# Patient Record
Sex: FEMALE | Race: ASIAN
[De-identification: names, ages, dates, MRNs, and addresses within clinical notes are randomized per-mention and may not be internally consistent; named-entity substitution may affect disease eponyms.]

---

## 2023-03-02 ENCOUNTER — OFFICE VISIT (OUTPATIENT)
Dept: FAMILY MEDICINE CLINIC | Facility: CLINIC | Age: 23
End: 2023-03-02
Payer: COMMERCIAL

## 2023-03-02 VITALS
BODY MASS INDEX: 22.69 KG/M2 | WEIGHT: 136.2 LBS | HEART RATE: 69 BPM | HEIGHT: 65 IN | SYSTOLIC BLOOD PRESSURE: 124 MMHG | OXYGEN SATURATION: 98 % | DIASTOLIC BLOOD PRESSURE: 68 MMHG

## 2023-03-02 DIAGNOSIS — N91.5 OLIGOMENORRHEA, UNSPECIFIED TYPE: ICD-10-CM

## 2023-03-02 DIAGNOSIS — F41.0 PANIC DISORDER: ICD-10-CM

## 2023-03-02 DIAGNOSIS — F51.04 PSYCHOPHYSIOLOGICAL INSOMNIA: ICD-10-CM

## 2023-03-02 DIAGNOSIS — Z00.01 ENCOUNTER FOR ROUTINE ADULT HEALTH EXAMINATION WITH ABNORMAL FINDINGS: Primary | ICD-10-CM

## 2023-03-02 DIAGNOSIS — F41.1 GENERALIZED ANXIETY DISORDER: ICD-10-CM

## 2023-03-02 LAB
BASOPHILS # BLD: 0 K/UL (ref 0–0.2)
BASOPHILS NFR BLD: 1 % (ref 0–2)
DIFFERENTIAL METHOD BLD: NORMAL
EOSINOPHIL # BLD: 0.1 K/UL (ref 0–0.8)
EOSINOPHIL NFR BLD: 1 % (ref 0.5–7.8)
ERYTHROCYTE [DISTWIDTH] IN BLOOD BY AUTOMATED COUNT: 12.8 % (ref 11.9–14.6)
HCT VFR BLD AUTO: 41.7 % (ref 35.8–46.3)
HGB BLD-MCNC: 13.4 G/DL (ref 11.7–15.4)
IMM GRANULOCYTES # BLD AUTO: 0 K/UL (ref 0–0.5)
IMM GRANULOCYTES NFR BLD AUTO: 0 % (ref 0–5)
LYMPHOCYTES # BLD: 2.7 K/UL (ref 0.5–4.6)
LYMPHOCYTES NFR BLD: 44 % (ref 13–44)
MCH RBC QN AUTO: 28.5 PG (ref 26.1–32.9)
MCHC RBC AUTO-ENTMCNC: 32.1 G/DL (ref 31.4–35)
MCV RBC AUTO: 88.7 FL (ref 82–102)
MONOCYTES # BLD: 0.3 K/UL (ref 0.1–1.3)
MONOCYTES NFR BLD: 5 % (ref 4–12)
NEUTS SEG # BLD: 3.1 K/UL (ref 1.7–8.2)
NEUTS SEG NFR BLD: 49 % (ref 43–78)
NRBC # BLD: 0 K/UL (ref 0–0.2)
PLATELET # BLD AUTO: 340 K/UL (ref 150–450)
PMV BLD AUTO: 9.4 FL (ref 9.4–12.3)
RBC # BLD AUTO: 4.7 M/UL (ref 4.05–5.2)
WBC # BLD AUTO: 6.2 K/UL (ref 4.3–11.1)

## 2023-03-02 PROCEDURE — 99385 PREV VISIT NEW AGE 18-39: CPT | Performed by: FAMILY MEDICINE

## 2023-03-02 RX ORDER — HYDROXYZINE HYDROCHLORIDE 25 MG/1
25 TABLET, FILM COATED ORAL EVERY 8 HOURS PRN
Qty: 90 TABLET | Refills: 3 | Status: SHIPPED | OUTPATIENT
Start: 2023-03-02

## 2023-03-02 SDOH — ECONOMIC STABILITY: FOOD INSECURITY: WITHIN THE PAST 12 MONTHS, THE FOOD YOU BOUGHT JUST DIDN'T LAST AND YOU DIDN'T HAVE MONEY TO GET MORE.: NEVER TRUE

## 2023-03-02 SDOH — ECONOMIC STABILITY: INCOME INSECURITY: HOW HARD IS IT FOR YOU TO PAY FOR THE VERY BASICS LIKE FOOD, HOUSING, MEDICAL CARE, AND HEATING?: NOT HARD AT ALL

## 2023-03-02 SDOH — HEALTH STABILITY: PHYSICAL HEALTH: ON AVERAGE, HOW MANY DAYS PER WEEK DO YOU ENGAGE IN MODERATE TO STRENUOUS EXERCISE (LIKE A BRISK WALK)?: 0 DAYS

## 2023-03-02 SDOH — HEALTH STABILITY: PHYSICAL HEALTH: ON AVERAGE, HOW MANY MINUTES DO YOU ENGAGE IN EXERCISE AT THIS LEVEL?: 0 MIN

## 2023-03-02 SDOH — ECONOMIC STABILITY: FOOD INSECURITY: WITHIN THE PAST 12 MONTHS, YOU WORRIED THAT YOUR FOOD WOULD RUN OUT BEFORE YOU GOT MONEY TO BUY MORE.: NEVER TRUE

## 2023-03-02 SDOH — ECONOMIC STABILITY: HOUSING INSECURITY
IN THE LAST 12 MONTHS, WAS THERE A TIME WHEN YOU DID NOT HAVE A STEADY PLACE TO SLEEP OR SLEPT IN A SHELTER (INCLUDING NOW)?: NO

## 2023-03-02 ASSESSMENT — ENCOUNTER SYMPTOMS
COUGH: 0
CONSTIPATION: 0
DIARRHEA: 0
ABDOMINAL PAIN: 0
SHORTNESS OF BREATH: 0
CHEST TIGHTNESS: 0

## 2023-03-02 ASSESSMENT — SOCIAL DETERMINANTS OF HEALTH (SDOH)

## 2023-03-02 ASSESSMENT — PATIENT HEALTH QUESTIONNAIRE - PHQ9
SUM OF ALL RESPONSES TO PHQ9 QUESTIONS 1 & 2: 0
SUM OF ALL RESPONSES TO PHQ QUESTIONS 1-9: 0
SUM OF ALL RESPONSES TO PHQ QUESTIONS 1-9: 0
2. FEELING DOWN, DEPRESSED OR HOPELESS: 0
1. LITTLE INTEREST OR PLEASURE IN DOING THINGS: 0
SUM OF ALL RESPONSES TO PHQ QUESTIONS 1-9: 0
SUM OF ALL RESPONSES TO PHQ QUESTIONS 1-9: 0

## 2023-03-02 NOTE — ASSESSMENT & PLAN NOTE
New problem today. LINO 7 score is 19. Also form consistent with ADHD. Patient would like to treat anxiety first.  Start with zoloft 25 mg daily for 1-2 weeks and then increase to 50 mg. Also hydroxyzine prn for panic or insomnia.

## 2023-03-02 NOTE — PROGRESS NOTES
Inocente Gitelman is a 23 y.o. female who presents today for the following:  Chief Complaint   Patient presents with    New Patient     New Patient; Establishing Care        No Known Allergies    No current outpatient medications on file. No current facility-administered medications for this visit. No past medical history on file. No past surgical history on file. Social History     Tobacco Use    Smoking status: Never     Passive exposure: Never    Smokeless tobacco: Never   Substance Use Topics    Alcohol use: Yes     Comment: socially        No family history on file. Patient is here today for new patient appointment and physical.  Patient has the following chronic diseases:     -PCOS: never taken medication. Reports menses will regulate if she exercises regularly. If she stops exercising she will go several months without a period. She does not want OCPs. She is planning to try to get pregnant in a few years. Acute concerns:  -Patient has acute concerns regarding anxiety and insomnia. She reports for past several months increasing anxiety. She feels anxious, panic at times, and irritable. She will wake up in the middle of the night and feel heart racing. She is more emotional than she has been in the past.  She is interested in starting medication. Has tried melatonin for insomnia. Helped initially but then stopped helping. Boyfriend feels she has ADHD. She states she loses objects frequently, takes her longer than others to study. Specialists:  none    Pap: no prior  Contraception: condoms  STDs: no  Menses: Long standing history of irregular menses. Patient reports if she exercises then will regulate        DIET: regular diet. EXERCISE: HIIT workouts at home        Review of Systems   Constitutional:  Negative for activity change, appetite change, fatigue and unexpected weight change. Respiratory:  Negative for cough, chest tightness and shortness of breath. Cardiovascular:  Negative for chest pain. Gastrointestinal:  Negative for abdominal pain, constipation and diarrhea. Neurological:  Negative for dizziness and light-headedness. Psychiatric/Behavioral:  Positive for decreased concentration and sleep disturbance. The patient is nervous/anxious. /68   Pulse 69   Ht 5' 5\" (1.651 m)   Wt 136 lb 3.2 oz (61.8 kg)   LMP 02/27/2023   SpO2 98%   BMI 22.66 kg/m²     LINO 7 score: 19  ADHD questionarre:   Physical Exam  Vitals reviewed. Constitutional:       General: She is not in acute distress. Appearance: Normal appearance. She is not ill-appearing. Cardiovascular:      Rate and Rhythm: Normal rate and regular rhythm. Heart sounds: Normal heart sounds. No murmur heard. Pulmonary:      Effort: Pulmonary effort is normal. No respiratory distress. Breath sounds: Normal breath sounds. No wheezing. Musculoskeletal:      Right lower leg: No edema. Left lower leg: No edema. Neurological:      General: No focal deficit present. Mental Status: She is alert and oriented to person, place, and time. Psychiatric:         Attention and Perception: Attention and perception normal.         Mood and Affect: Mood is anxious. Speech: Speech normal. Speech is not rapid and pressured or slurred. Behavior: Behavior is hyperactive. Cognition and Memory: Cognition and memory normal.         Judgment: Judgment normal. Judgment is not impulsive.         1. Encounter for routine adult health examination with abnormal findings  -Recommended 30 minutes of moderate intensity physical activity 3-5 days a week  -Recommended following DASH or Mediterranean diet.    -Tdap vaccine is recommended every 10 years   -Recommend COVID 19 vaccines per CDC recommendations  -Recommend annual flu vaccine  -Pap smear is recommended every 3-5 years for women aged 21-65 without history of hysterectomy  -     Comprehensive Metabolic Panel; Future  -     CBC with Auto Differential; Future  -     Lipid Panel; Future  2. Generalized anxiety disorder  Assessment & Plan:   New problem today. LINO 7 score is 19. Also form consistent with ADHD. Patient would like to treat anxiety first.  Start with zoloft 25 mg daily for 1-2 weeks and then increase to 50 mg. Also hydroxyzine prn for panic or insomnia. Orders:  -     TSH with Reflex; Future  -     sertraline (ZOLOFT) 50 MG tablet; Take 1 tablet by mouth daily, Disp-90 tablet, R-1Normal  -     hydrOXYzine HCl (ATARAX) 25 MG tablet; Take 1 tablet by mouth every 8 hours as needed for Anxiety (or insomnia), Disp-90 tablet, R-3Normal  3. Panic disorder  -     hydrOXYzine HCl (ATARAX) 25 MG tablet; Take 1 tablet by mouth every 8 hours as needed for Anxiety (or insomnia), Disp-90 tablet, R-3Normal  4. Psychophysiological insomnia  -     hydrOXYzine HCl (ATARAX) 25 MG tablet; Take 1 tablet by mouth every 8 hours as needed for Anxiety (or insomnia), Disp-90 tablet, R-3Normal  5. Oligomenorrhea, unspecified type  Referral to GYN. Patient is due for pap smear. Has what sounds consistent with PCOS. Also she is planning pregnancy in the next few years.  -     TSH with Reflex; Future  -     Hemoglobin A1C; Future  -     [address]     Patient informed, we will call with blood work results within one week. If you have not heard regarding results in over a week, please contact office. You can also review results on [facility].            Allen Hines MD

## 2023-03-03 LAB
ALBUMIN SERPL-MCNC: 3.9 G/DL (ref 3.5–5)
ALBUMIN/GLOB SERPL: 1.1 (ref 0.4–1.6)
ALP SERPL-CCNC: 51 U/L (ref 50–136)
ALT SERPL-CCNC: 15 U/L (ref 12–65)
ANION GAP SERPL CALC-SCNC: 3 MMOL/L (ref 2–11)
AST SERPL-CCNC: 13 U/L (ref 15–37)
BILIRUB SERPL-MCNC: 0.6 MG/DL (ref 0.2–1.1)
BUN SERPL-MCNC: 13 MG/DL (ref 6–23)
CALCIUM SERPL-MCNC: 9.6 MG/DL (ref 8.3–10.4)
CHLORIDE SERPL-SCNC: 105 MMOL/L (ref 101–110)
CHOLEST SERPL-MCNC: 195 MG/DL
CO2 SERPL-SCNC: 27 MMOL/L (ref 21–32)
CREAT SERPL-MCNC: 0.7 MG/DL (ref 0.6–1)
EST. AVERAGE GLUCOSE BLD GHB EST-MCNC: 111 MG/DL
GLOBULIN SER CALC-MCNC: 3.6 G/DL (ref 2.8–4.5)
GLUCOSE SERPL-MCNC: 93 MG/DL (ref 65–100)
HBA1C MFR BLD: 5.5 % (ref 4.8–5.6)
HDLC SERPL-MCNC: 51 MG/DL (ref 40–60)
HDLC SERPL: 3.8
LDLC SERPL CALC-MCNC: 123.4 MG/DL
POTASSIUM SERPL-SCNC: 4.1 MMOL/L (ref 3.5–5.1)
PROT SERPL-MCNC: 7.5 G/DL (ref 6.3–8.2)
SODIUM SERPL-SCNC: 135 MMOL/L (ref 133–143)
TRIGL SERPL-MCNC: 103 MG/DL (ref 35–150)
TSH W FREE THYROID IF ABNORMAL: 1.4 UIU/ML (ref 0.36–3.74)
VLDLC SERPL CALC-MCNC: 20.6 MG/DL (ref 6–23)

## 2023-03-29 ENCOUNTER — OFFICE VISIT (OUTPATIENT)
Dept: FAMILY MEDICINE CLINIC | Facility: CLINIC | Age: 23
End: 2023-03-29
Payer: COMMERCIAL

## 2023-03-29 VITALS
BODY MASS INDEX: 22.59 KG/M2 | HEART RATE: 70 BPM | DIASTOLIC BLOOD PRESSURE: 62 MMHG | WEIGHT: 135.6 LBS | SYSTOLIC BLOOD PRESSURE: 98 MMHG | HEIGHT: 65 IN | OXYGEN SATURATION: 98 %

## 2023-03-29 DIAGNOSIS — F90.0 ATTENTION DEFICIT HYPERACTIVITY DISORDER (ADHD), PREDOMINANTLY INATTENTIVE TYPE: ICD-10-CM

## 2023-03-29 DIAGNOSIS — F41.1 GENERALIZED ANXIETY DISORDER: Primary | ICD-10-CM

## 2023-03-29 DIAGNOSIS — F51.04 PSYCHOPHYSIOLOGICAL INSOMNIA: ICD-10-CM

## 2023-03-29 LAB
AMPHET UR QL SCN: NEGATIVE
BARBITURATES UR QL SCN: NEGATIVE
BENZODIAZ UR QL: NEGATIVE
CANNABINOIDS UR QL SCN: NEGATIVE
COCAINE UR QL SCN: NEGATIVE
METHADONE UR QL: NEGATIVE
OPIATES UR QL: NEGATIVE
PCP UR QL: NEGATIVE

## 2023-03-29 PROCEDURE — 99214 OFFICE O/P EST MOD 30 MIN: CPT | Performed by: FAMILY MEDICINE

## 2023-03-29 RX ORDER — ESCITALOPRAM OXALATE 5 MG/1
5 TABLET ORAL DAILY
Qty: 90 TABLET | Refills: 1 | Status: SHIPPED | OUTPATIENT
Start: 2023-03-29

## 2023-03-29 RX ORDER — TRAZODONE HYDROCHLORIDE 50 MG/1
50 TABLET ORAL NIGHTLY
Qty: 90 TABLET | Refills: 1 | Status: SHIPPED | OUTPATIENT
Start: 2023-03-29

## 2023-03-29 ASSESSMENT — ENCOUNTER SYMPTOMS
CHEST TIGHTNESS: 0
COUGH: 0
CONSTIPATION: 0
DIARRHEA: 0
SHORTNESS OF BREATH: 0
ABDOMINAL PAIN: 0

## 2023-03-29 ASSESSMENT — PATIENT HEALTH QUESTIONNAIRE - PHQ9
SUM OF ALL RESPONSES TO PHQ QUESTIONS 1-9: 0
1. LITTLE INTEREST OR PLEASURE IN DOING THINGS: 0
SUM OF ALL RESPONSES TO PHQ QUESTIONS 1-9: 0
SUM OF ALL RESPONSES TO PHQ9 QUESTIONS 1 & 2: 0
2. FEELING DOWN, DEPRESSED OR HOPELESS: 0

## 2023-03-29 NOTE — ASSESSMENT & PLAN NOTE
-Prior LINO 7 score is 19. Also form consistent with ADHD. -Initially tried on zoloft but did not tolerate secondary to dizziness and nausea.   -Hydroxyzine did not help with insomnia  -Trial of starting lexapro for anxiety and trazodone for insomnia

## 2023-03-29 NOTE — PROGRESS NOTES
Speech normal. Speech is not rapid and pressured or slurred. Behavior: Behavior is hyperactive. Cognition and Memory: Cognition and memory normal.         Judgment: Judgment normal. Judgment is not impulsive. 1. Generalized anxiety disorder  Assessment & Plan:  -Prior LINO 7 score is 19. Also form consistent with ADHD. -Initially tried on zoloft but did not tolerate secondary to dizziness and nausea. -Hydroxyzine did not help with insomnia  -Trial of starting lexapro for anxiety and trazodone for insomnia    Orders:  -     escitalopram (LEXAPRO) 5 MG tablet; Take 1 tablet by mouth daily, Disp-90 tablet, R-1Normal  -     traZODone (DESYREL) 50 MG tablet; Take 1 tablet by mouth nightly, Disp-90 tablet, R-1Normal  2. Attention deficit hyperactivity disorder (ADHD), predominantly inattentive type  Follow up on drug screen. Will start short acting ritalin as needed to help with studying.  -     Urine Drug Screen; Future  3. Psychophysiological insomnia  Assessment & Plan:   No improvement with hydroxyzine. Will try trazodone. Patient informed, we will call with blood work results within one week. If you have not heard regarding results in over a week, please contact office. You can also review results on [facility].            Judie Becerril MD

## 2023-03-30 RX ORDER — METHYLPHENIDATE HYDROCHLORIDE 5 MG/1
5 TABLET ORAL 2 TIMES DAILY
Qty: 60 TABLET | Refills: 0 | Status: SHIPPED | OUTPATIENT
Start: 2023-03-30 | End: 2023-04-29

## 2023-05-25 ENCOUNTER — TELEMEDICINE (OUTPATIENT)
Dept: FAMILY MEDICINE CLINIC | Facility: CLINIC | Age: 23
End: 2023-05-25
Payer: COMMERCIAL

## 2023-05-25 DIAGNOSIS — F90.0 ATTENTION DEFICIT HYPERACTIVITY DISORDER (ADHD), PREDOMINANTLY INATTENTIVE TYPE: ICD-10-CM

## 2023-05-25 DIAGNOSIS — F41.1 GENERALIZED ANXIETY DISORDER: Primary | ICD-10-CM

## 2023-05-25 DIAGNOSIS — F51.04 PSYCHOPHYSIOLOGICAL INSOMNIA: ICD-10-CM

## 2023-05-25 PROCEDURE — 99214 OFFICE O/P EST MOD 30 MIN: CPT | Performed by: FAMILY MEDICINE

## 2023-05-25 RX ORDER — METHYLPHENIDATE HYDROCHLORIDE 5 MG/1
5 TABLET ORAL 2 TIMES DAILY
Qty: 60 TABLET | Refills: 0 | Status: SHIPPED | OUTPATIENT
Start: 2023-07-24 | End: 2023-08-23

## 2023-05-25 RX ORDER — ESCITALOPRAM OXALATE 5 MG/1
5 TABLET ORAL DAILY
Qty: 90 TABLET | Refills: 1 | Status: SHIPPED | OUTPATIENT
Start: 2023-05-25

## 2023-05-25 RX ORDER — HYDROXYZINE HYDROCHLORIDE 25 MG/1
TABLET, FILM COATED ORAL
COMMUNITY
Start: 2023-03-29 | End: 2023-05-25

## 2023-05-25 RX ORDER — METHYLPHENIDATE HYDROCHLORIDE 5 MG/1
5 TABLET ORAL 2 TIMES DAILY
Qty: 60 TABLET | Refills: 0 | Status: SHIPPED | OUTPATIENT
Start: 2023-06-24 | End: 2023-07-24

## 2023-05-25 RX ORDER — METHYLPHENIDATE HYDROCHLORIDE 5 MG/1
5 TABLET ORAL 2 TIMES DAILY
Qty: 60 TABLET | Refills: 0 | Status: SHIPPED | OUTPATIENT
Start: 2023-05-25 | End: 2023-06-24

## 2023-05-25 ASSESSMENT — ENCOUNTER SYMPTOMS
ABDOMINAL PAIN: 0
CONSTIPATION: 0

## 2023-05-25 ASSESSMENT — PATIENT HEALTH QUESTIONNAIRE - PHQ9
SUM OF ALL RESPONSES TO PHQ QUESTIONS 1-9: 0
2. FEELING DOWN, DEPRESSED OR HOPELESS: 0
SUM OF ALL RESPONSES TO PHQ QUESTIONS 1-9: 0
SUM OF ALL RESPONSES TO PHQ9 QUESTIONS 1 & 2: 0
SUM OF ALL RESPONSES TO PHQ QUESTIONS 1-9: 0
1. LITTLE INTEREST OR PLEASURE IN DOING THINGS: 0
SUM OF ALL RESPONSES TO PHQ QUESTIONS 1-9: 0

## 2023-05-25 NOTE — ASSESSMENT & PLAN NOTE
-Prior LINO 7 score is 19. Also form consistent with ADHD. -Initially tried on zoloft but did not tolerate secondary to dizziness and nausea. -Hydroxyzine did not help with insomnia  -Improvement of anxiety with lexapro. Patient also reports sleeping well at night.

## 2023-05-25 NOTE — ASSESSMENT & PLAN NOTE
questionnaire is consistent with ADHD. -Signed controlled substance agreement. Normal drug test  -Reports excellent symptom relief with low dose of ritalin.  -Desires to continue with medication.

## 2023-05-25 NOTE — ASSESSMENT & PLAN NOTE
No improvement with hydroxyzine. Currently sleeping through the night with taking lexapro in the evenings.

## 2023-05-25 NOTE — PROGRESS NOTES
Alexandrea Rivera (:  2000) is a Established patient, presenting virtually for evaluation of the following: adhd and anxiety    Assessment & Plan   Below is the assessment and plan developed based on review of pertinent history, physical exam, labs, studies, and medications. 1. Generalized anxiety disorder  Assessment & Plan:  -Prior LINO 7 score is 19. Also form consistent with ADHD. -Initially tried on zoloft but did not tolerate secondary to dizziness and nausea. -Hydroxyzine did not help with insomnia  -Improvement of anxiety with lexapro. Patient also reports sleeping well at night. Orders:  -     escitalopram (LEXAPRO) 5 MG tablet; Take 1 tablet by mouth daily, Disp-90 tablet, R-1Normal  2. Psychophysiological insomnia  Assessment & Plan:   No improvement with hydroxyzine. Currently sleeping through the night with taking lexapro in the evenings. 3. Attention deficit hyperactivity disorder (ADHD), predominantly inattentive type  Assessment & Plan:   questionnaire is consistent with ADHD. -Signed controlled substance agreement. Normal drug test  -Reports excellent symptom relief with low dose of ritalin.  -Desires to continue with medication. Orders:  -     methylphenidate (RITALIN) 5 MG tablet; Take 1 tablet by mouth 2 times daily for 30 days. Max Daily Amount: 10 mg, Disp-60 tablet, R-0Normal  -     methylphenidate (RITALIN) 5 MG tablet; Take 1 tablet by mouth 2 times daily for 30 days. Max Daily Amount: 10 mg, Disp-60 tablet, R-0Normal  -     methylphenidate (RITALIN) 5 MG tablet; Take 1 tablet by mouth 2 times daily for 30 days. Max Daily Amount: 10 mg, Disp-60 tablet, R-0Normal    No follow-ups on file. Subjective   Patient is here today for new patient appointment and physical.  Patient has the following chronic diseases:     -PCOS: never taken medication. Reports menses will regulate if she exercises regularly. If she stops exercising she will go several months without a period.

## 2023-08-16 ENCOUNTER — OFFICE VISIT (OUTPATIENT)
Dept: OBGYN CLINIC | Age: 23
End: 2023-08-16
Payer: COMMERCIAL

## 2023-08-16 VITALS
HEIGHT: 65 IN | SYSTOLIC BLOOD PRESSURE: 100 MMHG | DIASTOLIC BLOOD PRESSURE: 66 MMHG | BODY MASS INDEX: 23.16 KG/M2 | WEIGHT: 139 LBS

## 2023-08-16 DIAGNOSIS — Z13.228 SCREENING FOR ENDOCRINE/METABOLIC/IMMUNITY DISORDERS: ICD-10-CM

## 2023-08-16 DIAGNOSIS — N91.3 PRIMARY OLIGOMENORRHEA: Primary | ICD-10-CM

## 2023-08-16 DIAGNOSIS — Z13.0 SCREENING FOR ENDOCRINE/METABOLIC/IMMUNITY DISORDERS: ICD-10-CM

## 2023-08-16 DIAGNOSIS — Z13.29 SCREENING FOR ENDOCRINE/METABOLIC/IMMUNITY DISORDERS: ICD-10-CM

## 2023-08-16 DIAGNOSIS — N91.3 PRIMARY OLIGOMENORRHEA: ICD-10-CM

## 2023-08-16 LAB
FSH SERPL-ACNC: 7.8 MIU/ML
LH SERPL-ACNC: 14.2 MIU/ML
PROLACTIN SERPL-MCNC: 10.2 NG/ML
TSH W FREE THYROID IF ABNORMAL: 1.66 UIU/ML (ref 0.36–3.74)

## 2023-08-16 PROCEDURE — 99204 OFFICE O/P NEW MOD 45 MIN: CPT | Performed by: OBSTETRICS & GYNECOLOGY

## 2023-08-16 NOTE — PROGRESS NOTES
[facility]  [place], [address], [address] [facility]    Shanice Fitch MD, Radha Hammonds MD, FACOG  AYSHA Desai-BC    Assessment/Plan     Jay Rossi was seen today for new patient. Diagnoses and all orders for this visit:    Primary oligomenorrhea  Comments:  - suspect PCOS  - discussed rotterdam criteria (she has oligo and possibly polycystic ovaries). Denies clinical hyperandrogenism, will check testosterone levels. - will check labs and pelvic US  - implications of PCOS discussed including risk of endometrial hyperplasia/cancer with prolonged oligomenorrhea, infertility, and risk of metabolic disorders (CV disease, HLP, DM etc). - recommend controlling bleeding unless trying to conceive actively  - normal TSH 3/2023   - A1C normal 3/2023   Orders:  -     Follicle Stimulating Hormone; Future  -     Prolactin; Future  -     Luteinizing Hormone; Future  -     DHEA-Sulfate; Future  -     17-OH Progesterone, LC/MS; Future  -     Testosterone, free, total; Future         F/u for pelvic US and lab review  Will also schedule annual exam/pap smear       Subjective     Marifer Mcnulty 20 y.o. Jc Jaimes presents today for a gyn problem of oligomenorrhea. New patient. Medical hx c/b anxiety, insomnia, ADHD, She reports she has had irregular periods for several years, and was told by her PCP at age 14 that she likely has PCOS. She states she was told she has \"bubbles on her ovaries\" but she is not certain she had an ultrasound in the past.     Menarche age 4, periods are every 3-4 months (longest without a period was 5 months). She denies any IM bleeding or abnormal discharge. No breast issues. Sexually active, uses condoms. No pain/bleeding with sex. Has constipation, no blood. No urinary issues. No hair loss or hirsutism but reports hair grows quickly. No acne. No visual loss or HA. Has rare migraines. Works as a  and thinks visual acuity worsening.      When she was

## 2023-08-16 NOTE — PROGRESS NOTES
Patient comes in today as a New Patient for Oligomenorrhea. Patient states she does not get a period every month. Patient her PCP told her she had \"bubble in her body\" and needs to work out. She stated she works out 3 times per week. Patient states she might get a period every 3 months. Patient states when she gets a period it last 7-8 days with a flow that varies each time. Patient states her recent period was heavy. Patient states she is worried her irregular menses will affect her getting pregnant in a few years. LAST PAP:  Never    LAST MAMMO:  Never    LMP:  Patient's last menstrual period was 06/16/2023 (approximate).     BIRTH CONTROL:  none    TOBACCO USE:  No    FAMILY HISTORY OF:   Breast Cancer:  No   Ovarian Cancer:  No   Uterine Cancer:  No   Colon Cancer:  No    Vitals:    08/16/23 1019   BP: 100/66   Site: Left Upper Arm   Position: Sitting   Weight: 139 lb (63 kg)   Height: 5' 5\" (1.651 m)        Marion Reid MA  08/16/23  10:29 AM

## 2023-08-17 LAB
EST. AVERAGE GLUCOSE BLD GHB EST-MCNC: 114 MG/DL
HBA1C MFR BLD: 5.6 % (ref 4.8–5.6)

## 2023-08-18 LAB — DHEA-S SERPL-MCNC: 211 UG/DL (ref 84.8–378)

## 2023-08-19 LAB — 17OHP SERPL-MCNC: 86 NG/DL

## 2023-08-22 DIAGNOSIS — F41.1 GENERALIZED ANXIETY DISORDER: ICD-10-CM

## 2023-08-23 LAB
TESTOST FREE SERPL-MCNC: 1.5 PG/ML (ref 0–4.2)
TESTOST SERPL-MCNC: 39 NG/DL (ref 13–71)

## 2023-09-13 ENCOUNTER — OFFICE VISIT (OUTPATIENT)
Dept: OBGYN CLINIC | Age: 23
End: 2023-09-13

## 2023-09-13 VITALS
DIASTOLIC BLOOD PRESSURE: 68 MMHG | SYSTOLIC BLOOD PRESSURE: 112 MMHG | WEIGHT: 138 LBS | BODY MASS INDEX: 22.99 KG/M2 | HEIGHT: 65 IN

## 2023-09-13 DIAGNOSIS — Z30.011 ENCOUNTER FOR INITIAL PRESCRIPTION OF CONTRACEPTIVE PILLS: ICD-10-CM

## 2023-09-13 DIAGNOSIS — E28.2 PCOS (POLYCYSTIC OVARIAN SYNDROME): ICD-10-CM

## 2023-09-13 DIAGNOSIS — N91.3 PRIMARY OLIGOMENORRHEA: Primary | ICD-10-CM

## 2023-09-13 RX ORDER — NORETHINDRONE ACETATE AND ETHINYL ESTRADIOL 1MG-20(21)
1 KIT ORAL DAILY
Qty: 1 PACKET | Refills: 3 | Status: SHIPPED | OUTPATIENT
Start: 2023-09-13

## 2023-09-13 ASSESSMENT — PATIENT HEALTH QUESTIONNAIRE - PHQ9
2. FEELING DOWN, DEPRESSED OR HOPELESS: 0
SUM OF ALL RESPONSES TO PHQ9 QUESTIONS 1 & 2: 1
SUM OF ALL RESPONSES TO PHQ QUESTIONS 1-9: 1
1. LITTLE INTEREST OR PLEASURE IN DOING THINGS: 1
SUM OF ALL RESPONSES TO PHQ QUESTIONS 1-9: 1

## 2023-09-13 NOTE — PROGRESS NOTES
Patient here for gyn visit and US. LAST PAP:  never     LAST MAMMO:  never     LMP:  Patient's last menstrual period was 08/28/2023 (exact date).     BIRTH CONTROL:  condoms     TOBACCO USE:  No    FAMILY HISTORY OF:   Breast Cancer:  No   Ovarian Cancer:  No   Uterine Cancer:  No   Colon Cancer:  No    Vitals:    09/13/23 1326   BP: 112/68   Site: Right Upper Arm   Position: Sitting   Weight: 138 lb (62.6 kg)   Height: 5' 5\" (1.651 m)        Enedina Campos RN  09/13/23  1:29 PM
Unknown (3/2/2023)    Housing Stability Vital Sign     Unable to Pay for Housing in the Last Year: Not on file     Number of Places Lived in the Last Year: Not on file     Unstable Housing in the Last Year: No           No Known Allergies      Review of Systems    Constitutional:  No fevers or chills    Neuro:  No headaches, seizure activity    HEENT: no visual changes, bleeding gums    CV: No chest pain or palpitations    Resp: No SOB or cough    Breast:  No masses, pain, D/C, bleeding    GI:  No nausea/vomiting/diarrhea/constipation    : No dysuria or hematuria    MS:  No back, joint pain    Gyn:  As per HPI    Psych:  No depression, anxiety      Objective       Vitals:    09/13/23 1326   BP: 112/68         Physical Exam    General:  well developed, well nourished, in no acute distress     Head:   normocephalic and atraumatic     Msk:   no deformity or scoliosis noted with normal posture and gait     Skin:   intact without lesions or rashes     Psych:  alert and cooperative; normal mood and affect; normal attention span and concentration        Larissa Diez MD   2:00 PM  09/13/23

## 2023-09-19 ENCOUNTER — OFFICE VISIT (OUTPATIENT)
Dept: OBGYN CLINIC | Age: 23
End: 2023-09-19
Payer: COMMERCIAL

## 2023-09-19 VITALS
BODY MASS INDEX: 22.82 KG/M2 | SYSTOLIC BLOOD PRESSURE: 114 MMHG | HEIGHT: 65 IN | WEIGHT: 137 LBS | DIASTOLIC BLOOD PRESSURE: 66 MMHG

## 2023-09-19 DIAGNOSIS — E28.2 PCOS (POLYCYSTIC OVARIAN SYNDROME): ICD-10-CM

## 2023-09-19 DIAGNOSIS — Z12.4 ENCOUNTER FOR PAPANICOLAOU SMEAR FOR CERVICAL CANCER SCREENING: ICD-10-CM

## 2023-09-19 DIAGNOSIS — Z01.419 ENCNTR FOR GYN EXAM (GENERAL) (ROUTINE) W/O ABN FINDINGS: Primary | ICD-10-CM

## 2023-09-19 DIAGNOSIS — Z11.3 ROUTINE SCREENING FOR STI (SEXUALLY TRANSMITTED INFECTION): ICD-10-CM

## 2023-09-19 PROCEDURE — 99395 PREV VISIT EST AGE 18-39: CPT | Performed by: OBSTETRICS & GYNECOLOGY

## 2023-09-19 ASSESSMENT — PATIENT HEALTH QUESTIONNAIRE - PHQ9
2. FEELING DOWN, DEPRESSED OR HOPELESS: 0
SUM OF ALL RESPONSES TO PHQ QUESTIONS 1-9: 0
SUM OF ALL RESPONSES TO PHQ9 QUESTIONS 1 & 2: 0
SUM OF ALL RESPONSES TO PHQ QUESTIONS 1-9: 0
1. LITTLE INTEREST OR PLEASURE IN DOING THINGS: 0
SUM OF ALL RESPONSES TO PHQ QUESTIONS 1-9: 0
SUM OF ALL RESPONSES TO PHQ QUESTIONS 1-9: 0

## 2023-09-19 NOTE — PROGRESS NOTES
Pt comes in today for AE. LAST PAP:  never    LAST MAMMO:  never    LMP:  Patient's last menstrual period was 08/28/2023 (exact date).     BIRTH CONTROL:  OCP (estrogen/progesterone)    TOBACCO USE:  No    FAMILY HISTORY OF:   Breast Cancer:  No   Ovarian Cancer:  No   Uterine Cancer:  No   Colon Cancer:  No    Vitals:    09/19/23 0851   BP: 114/66   Site: Left Upper Arm   Position: Sitting   Weight: 137 lb (62.1 kg)   Height: 5' 5\" (1.651 m)        Antolin Chavez MA  09/19/23  8:57 AM
Systems    Constitutional:  No fevers or chills    Neuro:  No headaches, seizure activity    HEENT: no visual changes, bleeding gums    CV: No chest pain or palpitations    Resp: No SOB or cough    Breast:  No masses, pain, D/C, bleeding    GI:  No nausea/vomiting/diarrhea/constipation    : No dysuria or hematuria    MS:  No back, point pain    Gyn:  No menstrual complaints, pelvic pain    Psych:  No depression, anxiety      Objective       Vitals:    09/19/23 0851   BP: 114/66         Physical Exam    General:  well developed, well nourished, in no acute distress     Head:   normocephalic and atraumatic     Mouth:  no deformity or lesions with good dentition     Neck:  no masses, thyromegaly, or abnormal cervical nodes     Chest Wall:  no deformities     Breasts: breast symetrical w/o masses,skin changes,dimpling,or nipple discharge     Lungs:  clear bilaterally with normal respirations     Heart:   regular rate and rhythm, S1, S2 without murmurs     Abdomen:  bowel sounds positive; abdomen soft and non-tender without masses, organomegaly, or hernias noted     Pelvic Exam:       External: normal female genitalia without lesions or masses       Vagina: normal without lesions or masses       Cervix: normal without lesions or masses       Adnexa: normal bimanual exam without masses or fullness       Uterus: normal by palpation       Pap smear:  performed     Msk:   no deformity or scoliosis noted with normal posture and gait     Extremities: no clubbing, cyanosis, edema, or deformity noted with normal full range of motion of all joints     Neurologic:  no focal deficits,normal coordination, muscle strength and tone     Skin:   intact without lesions or rashes     Cervical Nodes:  no significant adenopathy     Axillary Nodes:   no significant adenopathy     Psych:  alert and cooperative; normal mood and affect; normal attention span and concentration      Rose Marie Goodman MD   9:19 AM  09/19/23

## 2023-09-20 PROBLEM — E28.2 PCOS (POLYCYSTIC OVARIAN SYNDROME): Status: ACTIVE | Noted: 2023-08-16

## 2023-09-25 LAB
C TRACH RRNA CVX QL NAA+PROBE: NEGATIVE
CYTOLOGIST CVX/VAG CYTO: NORMAL
CYTOLOGY CVX/VAG DOC THIN PREP: NORMAL
HPV REFLEX: NORMAL
Lab: NORMAL
Lab: NORMAL
N GONORRHOEA RRNA CVX QL NAA+PROBE: NEGATIVE
PATH REPORT.FINAL DX SPEC: NORMAL
STAT OF ADQ CVX/VAG CYTO-IMP: NORMAL
T VAGINALIS RRNA SPEC QL NAA+PROBE: NEGATIVE

## 2023-12-15 DIAGNOSIS — Z30.011 ENCOUNTER FOR INITIAL PRESCRIPTION OF CONTRACEPTIVE PILLS: ICD-10-CM

## 2023-12-15 DIAGNOSIS — Z30.41 ENCOUNTER FOR BIRTH CONTROL PILLS MAINTENANCE: Primary | ICD-10-CM

## 2023-12-15 RX ORDER — NORETHINDRONE ACETATE AND ETHINYL ESTRADIOL 1MG-20(21)
1 KIT ORAL DAILY
Qty: 1 PACKET | Refills: 0 | Status: SHIPPED | OUTPATIENT
Start: 2023-12-15

## 2024-02-17 DIAGNOSIS — Z30.41 ENCOUNTER FOR BIRTH CONTROL PILLS MAINTENANCE: ICD-10-CM

## 2024-02-19 RX ORDER — NORETHINDRONE ACETATE AND ETHINYL ESTRADIOL AND FERROUS FUMARATE 1MG-20(21)
1 KIT ORAL DAILY
Qty: 28 TABLET | OUTPATIENT
Start: 2024-02-19